# Patient Record
Sex: FEMALE | Race: AMERICAN INDIAN OR ALASKA NATIVE
[De-identification: names, ages, dates, MRNs, and addresses within clinical notes are randomized per-mention and may not be internally consistent; named-entity substitution may affect disease eponyms.]

---

## 2019-02-26 ENCOUNTER — HOSPITAL ENCOUNTER (EMERGENCY)
Dept: HOSPITAL 5 - ED | Age: 49
Discharge: HOME | End: 2019-02-26
Payer: COMMERCIAL
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
## 2019-02-26 DIAGNOSIS — T78.09XA: Primary | ICD-10-CM

## 2019-02-26 PROCEDURE — 99282 EMERGENCY DEPT VISIT SF MDM: CPT

## 2019-02-26 PROCEDURE — 96374 THER/PROPH/DIAG INJ IV PUSH: CPT

## 2019-02-26 PROCEDURE — 96375 TX/PRO/DX INJ NEW DRUG ADDON: CPT

## 2019-02-26 PROCEDURE — 96372 THER/PROPH/DIAG INJ SC/IM: CPT

## 2019-02-26 NOTE — EMERGENCY DEPARTMENT REPORT
ED Allergic Reaction HPI





- General


Chief complaint: Allergic Reaction


Stated complaint: ALLERGIC REACTION


Time Seen by Provider: 02/26/19 14:28


Source: patient


Mode of arrival: Ambulatory


Limitations: No Limitations





- History of Present Illness


Initial Comments: 





Ms. Mueller is a very pleasant 48-year-old female without significant past 

medical history presents with allergic reaction.  She has rash and itching of 

her upper extremities and lower lip swelling.  The symptoms began earlier this 

morning.  She ate a breakfast sandwich this AM.  From our cafeteria.  She is 

here accompanying her friend in outpatient surgery. 30 minutes ago, symptoms 

progressed to lip swelling.  She's had minor allergic reactions in the past.  

She has never required ER visits.  No known drug or food allergies.  She recalls

having localized allergic reaction in the past after obtaining tattoo on her 

back. Denies throat swelling.  Denies Shortness breath.  She denies wheezing.  

She has large redness and swelling over left hand.  Swelling mostly involved the

right lower lip.


MD Complaint: allergic reaction


-: Gradual, hour(s) (several hours)


Exposure: unknown


Symptoms: rash, itching, lip swelling


Severity: moderate





- Related Data


                                  Previous Rx's











 Medication  Instructions  Recorded  Last Taken  Type


 


EPINEPHrine [Epipen] 0.3 mg IJ ONCE PRN #1 auto.injct 02/26/19 Unknown Rx


 


Famotidine [Pepcid] 20 mg PO BID #6 tablet 02/26/19 Unknown Rx


 


diphenhydrAMINE [Benadryl CAP] 25 mg PO Q6HR 3 Days #12 capsule 02/26/19 Unknown

Rx


 


predniSONE [Deltasone] 3 tab PO QDAY 3 Days #9 tab 02/26/19 Unknown Rx











                                    Allergies











Allergy/AdvReac Type Severity Reaction Status Date / Time


 


No Known Allergies Allergy   Unverified 02/26/19 14:20














ED Review of Systems


ROS: 


Stated complaint: ALLERGIC REACTION


Other details as noted in HPI





Comment: All other systems reviewed and negative


Constitutional: denies: fever, malaise


Respiratory: denies: cough


Cardiovascular: denies: chest pain





ED Past Medical Hx





- Past Medical History


Previous Medical History?: No





- Family History


Family history: other (noncontributory to today's presentation)





- Social History


Smoking Status: Never Smoker


Substance Use Type: None





- Medications


Home Medications: 


                                Home Medications











 Medication  Instructions  Recorded  Confirmed  Last Taken  Type


 


EPINEPHrine [Epipen] 0.3 mg IJ ONCE PRN #1 auto.injct 02/26/19  Unknown Rx


 


Famotidine [Pepcid] 20 mg PO BID #6 tablet 02/26/19  Unknown Rx


 


diphenhydrAMINE [Benadryl CAP] 25 mg PO Q6HR 3 Days #12 capsule 02/26/19  

Unknown Rx


 


predniSONE [Deltasone] 3 tab PO QDAY 3 Days #9 tab 02/26/19  Unknown Rx














ED Physical Exam





- General


Limitations: No Limitations


General appearance: alert, in no apparent distress, other (constantly scratching

 upper extremities)





- Head


Head exam: Present: atraumatic, normocephalic





- Eye


Eye exam: Present: normal appearance





- ENT


ENT exam: Present: mucous membranes moist, other (right lower lip swelling)





- Neck


Neck exam: Present: normal inspection





- Respiratory


Respiratory exam: Present: normal lung sounds bilaterally.  Absent: respiratory 

distress, wheezes





- Cardiovascular


Cardiovascular Exam: Present: regular rate, normal rhythm, normal heart sounds. 

 Absent: systolic murmur, diastolic murmur, rubs, gallop





- GI/Abdominal


GI/Abdominal exam: Present: soft, normal bowel sounds.  Absent: distended, 

tenderness, guarding





- Extremities Exam


Extremities exam: Present: normal inspection





- Back Exam


Back exam: Present: normal inspection





- Neurological Exam


Neurological exam: Present: alert, oriented X3





- Psychiatric


Psychiatric exam: Present: normal affect, normal mood





- Skin


Skin exam: Present: other (diffuse erythema both forearms, urticaria large 

dorsal region left hand).  Absent: rash





ED Medical Decision Making





- Medical Decision Making





Ms. Mueller presents with anaphylaxis with lip swelling and rash denies 

pruritus.  Unknown allergen.  Received epinephrine, famotidine, 

methylprednisolone and diphenhydramine in the ED with improvement of symptoms.





She did not have evidence of shock or airway compromise.





Prescribed prednisone, Benadryl, Pepcid and EpiPen.  Strongly encouraged 

evaluation by allergist.  Provided referral to allergist.


Critical care attestation.: 


If time is entered above; I have spent that time in minutes in the direct care 

of this critically ill patient, excluding procedure time.








ED Disposition


Clinical Impression: 


 Anaphylaxis





Disposition: DC-01 TO HOME OR SELFCARE


Is pt being admited?: No


Does the pt Need Aspirin: No


Condition: Stable


Instructions:  Anaphylaxis (ED)


Prescriptions: 


diphenhydrAMINE [Benadryl CAP] 25 mg PO Q6HR 3 Days #12 capsule


EPINEPHrine [Epipen] 0.3 mg IJ ONCE PRN #1 auto.injct


 PRN Reason: severe allergic reaction


Famotidine [Pepcid] 20 mg PO BID #6 tablet


predniSONE [Deltasone] 3 tab PO QDAY 3 Days #9 tab

## 2019-02-28 ENCOUNTER — HOSPITAL ENCOUNTER (EMERGENCY)
Dept: HOSPITAL 5 - ED | Age: 49
Discharge: HOME | End: 2019-02-28
Payer: COMMERCIAL

## 2019-02-28 VITALS — DIASTOLIC BLOOD PRESSURE: 82 MMHG | SYSTOLIC BLOOD PRESSURE: 132 MMHG
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
## 2019-02-28 DIAGNOSIS — T78.2XXA: Primary | ICD-10-CM

## 2019-02-28 DIAGNOSIS — T78.40XA: ICD-10-CM

## 2019-02-28 LAB
BASOPHILS # (AUTO): 0 K/MM3 (ref 0–0.1)
BASOPHILS NFR BLD AUTO: 0.3 % (ref 0–1.8)
BUN SERPL-MCNC: 14 MG/DL (ref 7–17)
BUN/CREAT SERPL: 20 %
CALCIUM SERPL-MCNC: 8.8 MG/DL (ref 8.4–10.2)
EOSINOPHIL # BLD AUTO: 0.1 K/MM3 (ref 0–0.4)
EOSINOPHIL NFR BLD AUTO: 2.1 % (ref 0–4.3)
HCT VFR BLD CALC: 39.4 % (ref 30.3–42.9)
HEMOLYSIS INDEX: 9
HGB BLD-MCNC: 13.4 GM/DL (ref 10.1–14.3)
LYMPHOCYTES # BLD AUTO: 1.2 K/MM3 (ref 1.2–5.4)
LYMPHOCYTES NFR BLD AUTO: 20.2 % (ref 13.4–35)
MCHC RBC AUTO-ENTMCNC: 34 % (ref 30–34)
MCV RBC AUTO: 92 FL (ref 79–97)
MONOCYTES # (AUTO): 0.4 K/MM3 (ref 0–0.8)
MONOCYTES % (AUTO): 6 % (ref 0–7.3)
PLATELET # BLD: 298 K/MM3 (ref 140–440)
RBC # BLD AUTO: 4.31 M/MM3 (ref 3.65–5.03)

## 2019-02-28 PROCEDURE — 71045 X-RAY EXAM CHEST 1 VIEW: CPT

## 2019-02-28 PROCEDURE — 96372 THER/PROPH/DIAG INJ SC/IM: CPT

## 2019-02-28 PROCEDURE — 96375 TX/PRO/DX INJ NEW DRUG ADDON: CPT

## 2019-02-28 PROCEDURE — 85025 COMPLETE CBC W/AUTO DIFF WBC: CPT

## 2019-02-28 PROCEDURE — 99284 EMERGENCY DEPT VISIT MOD MDM: CPT

## 2019-02-28 PROCEDURE — 96376 TX/PRO/DX INJ SAME DRUG ADON: CPT

## 2019-02-28 PROCEDURE — 96374 THER/PROPH/DIAG INJ IV PUSH: CPT

## 2019-02-28 PROCEDURE — 36415 COLL VENOUS BLD VENIPUNCTURE: CPT

## 2019-02-28 PROCEDURE — 70360 X-RAY EXAM OF NECK: CPT

## 2019-02-28 PROCEDURE — 80048 BASIC METABOLIC PNL TOTAL CA: CPT

## 2019-02-28 NOTE — XRAY REPORT
PROCEDURE: XR CHEST 1V AP 

 

TECHNIQUE:  Chest, single frontal view 

 

HISTORY: SOB 

 

COMPARISONS: None  

 

FINDINGS: 

There is no visible pulmonary consolidation. 

No radiographically visible pneumothorax. 

No evidence of pleural effusion. 

Cardiac silhouette size is normal without vascular congestion.  

No visible acute displaced fracture in the regional skeleton. 

 

IMPRESSION: 

No acute cardiopulmonary disease in the visualized chest. 

 

 

 

This document is electronically signed by Feliz Olvera MD., February 28 2019 02:56:09 PM ET

## 2019-02-28 NOTE — XRAY REPORT
PROCEDURE: XR NECK SOFT TISSUE 

 

TECHNIQUE:  2 views of the neck with soft tissue technique 

 

HISTORY: allergic reaction, trouble swallowing 

 

COMPARISONS: None  

 

FINDINGS: 

Degenerative change with moderate disc narrowing at C5-6. 

Soft tissue examination of the neck shows no unusual distention of the hypopharynx and the airway jessica
ears patent. 

Normal-appearing vallecula and pyriform sinuses.  

There is no definite abnormality in the region of the epiglottis and aryepiglottic folds.  

No radiographically visible radiopaque foreign body. 

The prevertebral soft tissues are normal. 

 

IMPRESSION: 

No radiographic evidence of acute pathology or radiopaque foreign body 

 

 

 

This document is electronically signed by Feliz Olvera MD., February 28 2019 03:20:48 PM ET

## 2019-02-28 NOTE — EMERGENCY DEPARTMENT REPORT
HPI





- General


Time Seen by Provider: 02/28/19 11:55





- HPI


HPI: 





48-year-old  female presents to the emergency department from 

work with complaint of some swelling to the upper lip, itchy dry throat and 

feeling like her throat is swelling.  The patient was here 2 days ago for some 

similar symptoms, except for it was the lower lip at that time.  She does not 

know of any particular allergies that she has that may have caused the symptoms.

 She otherwise has a past medical history of hypertension.  She did not take 

anything for her symptoms prior to arrival.





ED Past Medical Hx





- Social History


Smoking Status: Never Smoker


Substance Use Type: None





- Medications


Home Medications: 


                                Home Medications











 Medication  Instructions  Recorded  Confirmed  Last Taken  Type


 


EPINEPHrine [Epipen] 0.3 mg IJ ONCE PRN #1 auto.injct 02/26/19  Unknown Rx


 


RX: Famotidine [Pepcid] 20 mg PO BID #6 tablet 02/28/19  Unknown Rx


 


RX: diphenhydrAMINE [Benadryl CAP] 25 mg PO Q6HR 3 Days #12 capsule 02/28/19  

Unknown Rx


 


RX: predniSONE [Deltasone] 3 tab PO QDAY 3 Days #9 tab 02/28/19  Unknown Rx














ED Review of Systems


ROS: 


Stated complaint: THROAT CLOSING


Other details as noted in HPI





Comment: All other systems reviewed and negative


Constitutional: denies: chills, fever


Eyes: denies: eye pain, vision change


ENT: other (itchy dry throat, lip swelling, throat swelling sensation).  denies:

ear pain


Respiratory: denies: cough, shortness of breath


Cardiovascular: denies: chest pain, palpitations


Gastrointestinal: denies: abdominal pain, vomiting


Genitourinary: denies: dysuria, discharge


Musculoskeletal: denies: back pain, arthralgia


Skin: denies: rash, lesions


Neurological: denies: headache, weakness





Physical Exam





- Physical Exam


Physical Exam: 





GENERAL: The patient is well-developed well-nourished.


HEENT: Normocephalic.  Atraumatic.   Patient has moist mucous membranes.  

Mallampati of 1. No drooling or trismus. 


EYES:  Extraocular motions are intact.  Pupils are equal and reactive to light 

bilaterally.


NECK: Supple.  Trachea is midline.


CHEST/LUNGS: Clear to auscultation.  There is no respiratory distress noted.  


HEART/CARDIOVASCULAR: Regular.  There is no tachycardia.  There is no obvious 

murmur.


ABDOMEN:  There is no abdominal distention.


SKIN: There is mild swelling of right upper lip. 


NEURO: The patient is awake, alert, and oriented.  The patient is cooperative.  

The patient has no focal neurologic deficits.  The patient has normal speech.


MUSCULOSKELETAL: There is no tenderness or deformity.  There is no limitation 

range of motion.  There is no evidence of acute injury.





ED Medical Decision Making





- Lab Data


Result diagrams: 


                                 02/28/19 13:29





                                 02/28/19 13:29





- Radiology Data


Radiology results: image reviewed


interpreted by me: 





Chest x-ray does not show any pneumothorax, pleural effusion, pneumonia or 

obvious focal consolidation.





X-ray of the soft tissue neck does not show any narrowing of airway or any 

opaque foreign body





- Medical Decision Making


This patient presents with what appears to be an allergic reaction and/or some p

ossible anaphylaxis/angioedema to an unknown allergen.  This is the second time 

in 3 days that this has occurred.  The patient says that she feels like her 

throat is swelling and/or getting tight at the appears to be a patent posterior 

pharynx with a Mallampati of 1.  There is no drooling or trismus.  Nonetheless, 

the patient was given Solu-Medrol, Pepcid, Benadryl and epinephrine.  X-rays 

were done of the chest and soft tissue neck that did not show any abnormalities 

or acute processes.  Patient was reevaluated multiple times over multiple hours 

and is feeling much better.  The swelling has gone down and she no longer feels 

as if there is any tightness of the throat.  She will go home and again on 

steroids, Benadryl and Pepcid and has a prescription for EpiPen at home.  She is

going to follow up with a primary care physician and allergist.  She will return

to the ER with any worsening of her symptoms or any acute distress.








- Differential Diagnosis


allergic reaction, angioedema, anaphylaxis, complement deficiency


Critical Care Time: No


Critical care attestation.: 


If time is entered above; I have spent that time in minutes in the direct care 

of this critically ill patient, excluding procedure time.








ED Disposition


Clinical Impression: 


Anaphylaxis


Qualifiers:


 Encounter type: initial encounter Qualified Code(s): T78.2XXA - Anaphylactic 

shock, unspecified, initial encounter





Allergic reaction


Qualifiers:


 Encounter type: initial encounter Qualified Code(s): T78.40XA - Allergy, 

unspecified, initial encounter





Disposition: DC-01 TO HOME OR SELFCARE


Is pt being admited?: No


Condition: Stable


Instructions:  Anaphylaxis (ED)


Additional Instructions: 


Please follow up with a primary care physician and you may need to follow-up 

with an allergist.  Return to the emergency Department with any worsening of 

your symptoms or any acute distress.  You were previously prescribed an EpiPen. 

Please use the EpiPen if you feel that there is any signs of shortness of 

breath, throat swelling/closing, drooling, secondary to an allergic reaction.  

However if you use the EpiPen, please call 911 or get in to the emergency 

Department immediately.


Prescriptions: 


RX: diphenhydrAMINE [Benadryl CAP] 25 mg PO Q6HR 3 Days #12 capsule


RX: Famotidine [Pepcid] 20 mg PO BID #6 tablet


RX: predniSONE [Deltasone] 3 tab PO QDAY 3 Days #9 tab


Referrals: 


KATIUSKA FIGUEROA DO [Staff Physician] - 2-3 Days


[facility] [Outside] - 2-3 Days


Time of Disposition: 15:13

## 2019-03-11 ENCOUNTER — HOSPITAL ENCOUNTER (EMERGENCY)
Dept: HOSPITAL 5 - ED | Age: 49
Discharge: HOME | End: 2019-03-11
Payer: COMMERCIAL

## 2019-03-11 VITALS — SYSTOLIC BLOOD PRESSURE: 146 MMHG | DIASTOLIC BLOOD PRESSURE: 94 MMHG

## 2019-03-11 DIAGNOSIS — J45.909: ICD-10-CM

## 2019-03-11 DIAGNOSIS — T78.40XA: Primary | ICD-10-CM
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
## 2019-03-11 DIAGNOSIS — I10: ICD-10-CM

## 2019-03-11 PROCEDURE — 96374 THER/PROPH/DIAG INJ IV PUSH: CPT

## 2019-03-11 PROCEDURE — 96375 TX/PRO/DX INJ NEW DRUG ADDON: CPT

## 2019-03-11 PROCEDURE — 99282 EMERGENCY DEPT VISIT SF MDM: CPT

## 2019-03-11 NOTE — EMERGENCY DEPARTMENT REPORT
HPI





- General


Chief Complaint: Allergic Reaction


Time Seen by Provider: 03/11/19 01:53





- HPI


HPI: 





48-year-old -American female presents to ED with upper lip swelling that 

she noticed about 2-1/2 hours ago.  Symptoms progressively worsened.  She denies

any shortness of breath, chest pain, urticaria.  Patient has had similar 

symptoms in the past but has not yet found a cause.  Denies any fever, chills or

night sweats.





ED Past Medical Hx





- Past Medical History


Previous Medical History?: Yes


Hx Hypertension: Yes


Hx Asthma: Yes





- Surgical History


Past Surgical History?: No





- Social History


Smoking Status: Never Smoker


Substance Use Type: Alcohol





- Medications


Home Medications: 


                                Home Medications











 Medication  Instructions  Recorded  Confirmed  Last Taken  Type


 


EPINEPHrine [Epipen] 0.3 mg IJ ONCE PRN #1 auto.injct 02/26/19  Unknown Rx


 


Famotidine [Pepcid] 20 mg PO BID #6 tablet 02/28/19  Unknown Rx


 


predniSONE [Deltasone] 3 tab PO QDAY 3 Days #9 tab 02/28/19  Unknown Rx


 


diphenhydrAMINE [Benadryl CAP] 25 mg PO Q6HR 3 Days #12 capsule 03/11/19  

Unknown Rx














ED Review of Systems


ROS: 


Stated complaint: ALLERGIC REACTION


Other details as noted in HPI





Comment: All other systems reviewed and negative


Constitutional: denies: chills, fever


Eyes: denies: eye pain, eye discharge, vision change


ENT: denies: ear pain, throat pain


Respiratory: denies: cough, shortness of breath, wheezing


Cardiovascular: denies: chest pain, palpitations


Endocrine: no symptoms reported


Gastrointestinal: denies: abdominal pain, nausea, diarrhea


Genitourinary: denies: urgency, dysuria, discharge


Musculoskeletal: denies: back pain, joint swelling, arthralgia


Skin: other (upper lip swelling).  denies: rash, lesions


Neurological: denies: headache, weakness, paresthesias


Psychiatric: denies: anxiety, depression


Hematological/Lymphatic: denies: easy bleeding, easy bruising





Physical Exam





- Physical Exam


Vital Signs: 


                                   Vital Signs











  03/11/19 03/11/19





  01:35 01:48


 


Temperature 98.0 F 98.2 F


 


Pulse Rate 91 H 80


 


Respiratory 18 15





Rate  


 


Blood Pressure 145/103 


 


Blood Pressure  152/98





[Left]  


 


O2 Sat by Pulse 98 100





Oximetry  











Physical Exam: 








- General


Limitations: No Limitations


General appearance: alert, in no apparent distress





- Head


Head exam: Present: atraumatic, normocephalic





- Eye


Eye exam: Present: normal appearance





- ENT


ENT exam: Present: mucous membranes moist





- Neck


Neck exam: Present: normal inspection





- Respiratory


Respiratory exam: Present: normal lung sounds bilaterally.  Absent: respiratory 

distress





- Cardiovascular


Cardiovascular Exam: Present: regular rate, normal rhythm.  Absent: systolic 

murmur, diastolic murmur, rubs, gallop





- GI/Abdominal


GI/Abdominal exam: Present: soft, normal bowel sounds





- Extremities Exam


Extremities exam: Present: normal inspection





- Back Exam


Back exam: Present: normal inspection





- Neurological Exam


Neurological exam: Present: alert, oriented X3





- Psychiatric


Psychiatric exam: Present: normal affect, normal mood





- Skin


Skin exam: Present: Upper lip swelling





ED Course


                                   Vital Signs











  03/11/19 03/11/19





  01:35 01:48


 


Temperature 98.0 F 98.2 F


 


Pulse Rate 91 H 80


 


Respiratory 18 15





Rate  


 


Blood Pressure 145/103 


 


Blood Pressure  152/98





[Left]  


 


O2 Sat by Pulse 98 100





Oximetry  














ED Medical Decision Making





- Medical Decision Making





48 y.o -American female presents to er with acute allergic reaction, 

upper lip swelling, was probably placed in the room, Solu-Medrol 125 IV given, 

Benadryl 25 mg IV given.


Critical care attestation.: 


If time is entered above; I have spent that time in minutes in the direct care 

of this critically ill patient, excluding procedure time.








ED Disposition


Clinical Impression: 


Allergic reaction


Qualifiers:


 Encounter type: initial encounter Qualified Code(s): T78.40XA - Allergy, 

unspecified, initial encounter





Disposition: DC-01 TO HOME OR SELFCARE


Is pt being admited?: No


Does the pt Need Aspirin: No


Condition: Stable


Instructions:  Urticaria (ED)


Prescriptions: 


diphenhydrAMINE [Benadryl CAP] 25 mg PO Q6HR 3 Days #12 capsule


Referrals: 


PRIMARY CARE,MD [Primary Care Provider] - 3-5 Days

## 2021-03-22 ENCOUNTER — OFFICE VISIT (OUTPATIENT)
Dept: URBAN - METROPOLITAN AREA CLINIC 109 | Facility: CLINIC | Age: 51
End: 2021-03-22
Payer: COMMERCIAL

## 2021-03-22 ENCOUNTER — LAB OUTSIDE AN ENCOUNTER (OUTPATIENT)
Dept: URBAN - METROPOLITAN AREA CLINIC 109 | Facility: CLINIC | Age: 51
End: 2021-03-22

## 2021-03-22 ENCOUNTER — WEB ENCOUNTER (OUTPATIENT)
Dept: URBAN - METROPOLITAN AREA CLINIC 109 | Facility: CLINIC | Age: 51
End: 2021-03-22

## 2021-03-22 ENCOUNTER — DASHBOARD ENCOUNTERS (OUTPATIENT)
Age: 51
End: 2021-03-22

## 2021-03-22 DIAGNOSIS — I10 ESSENTIAL HYPERTENSION: ICD-10-CM

## 2021-03-22 DIAGNOSIS — Z12.11 COLON CANCER SCREENING: ICD-10-CM

## 2021-03-22 PROBLEM — 59621000: Status: ACTIVE | Noted: 2021-03-22

## 2021-03-22 PROCEDURE — 99202 OFFICE O/P NEW SF 15 MIN: CPT | Performed by: INTERNAL MEDICINE

## 2021-03-22 NOTE — HPI-TODAY'S VISIT:
The patient has been referred for colon cancer screening. No prior studies.  No FH of colon cancer or polyps. Denies C/D, blood in the stool, abdominal pain or weight loss. She has well controlled htn.

## 2021-03-30 ENCOUNTER — OFFICE VISIT (OUTPATIENT)
Dept: URBAN - METROPOLITAN AREA SURGERY CENTER 23 | Facility: SURGERY CENTER | Age: 51
End: 2021-03-30
Payer: COMMERCIAL

## 2021-03-30 DIAGNOSIS — Z12.11 COLON CANCER SCREENING: ICD-10-CM

## 2021-03-30 PROCEDURE — 45378 DIAGNOSTIC COLONOSCOPY: CPT | Performed by: INTERNAL MEDICINE

## 2021-03-30 PROCEDURE — G8907 PT DOC NO EVENTS ON DISCHARG: HCPCS | Performed by: INTERNAL MEDICINE
